# Patient Record
Sex: MALE | Race: WHITE | Employment: STUDENT | URBAN - NONMETROPOLITAN AREA
[De-identification: names, ages, dates, MRNs, and addresses within clinical notes are randomized per-mention and may not be internally consistent; named-entity substitution may affect disease eponyms.]

---
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
## 2024-06-09 ENCOUNTER — HOSPITAL ENCOUNTER (OUTPATIENT)
Age: 24
Discharge: HOME | End: 2024-06-09
Payer: COMMERCIAL

## 2024-06-09 VITALS
RESPIRATION RATE: 16 BRPM | HEART RATE: 104 BPM | HEIGHT: 71 IN | DIASTOLIC BLOOD PRESSURE: 95 MMHG | WEIGHT: 205 LBS | TEMPERATURE: 99 F | BODY MASS INDEX: 28.7 KG/M2 | OXYGEN SATURATION: 98 % | SYSTOLIC BLOOD PRESSURE: 136 MMHG

## 2024-06-09 DIAGNOSIS — L03.119 CELLULITIS OF LOWER EXTREMITY, UNSPECIFIED LATERALITY: ICD-10-CM

## 2024-06-09 DIAGNOSIS — T78.40XA ALLERGIC REACTION, INITIAL ENCOUNTER: Primary | ICD-10-CM

## 2024-06-09 RX ORDER — PREDNISONE 10 MG/1
TABLET ORAL
Qty: 20 TABLET | Refills: 0 | Status: SHIPPED | OUTPATIENT
Start: 2024-06-09 | End: 2024-06-16

## 2024-06-09 RX ORDER — SULFAMETHOXAZOLE AND TRIMETHOPRIM 800; 160 MG/1; MG/1
1 TABLET ORAL 2 TIMES DAILY
Qty: 14 TABLET | Refills: 0 | Status: SHIPPED | OUTPATIENT
Start: 2024-06-09 | End: 2024-06-16

## 2024-06-09 NOTE — ED PROVIDER NOTES
Patient Seen in: [facility]      History     Chief Complaint   Patient presents with    Bite Sting,Insect     Stated Complaint: bug bites    Subjective:   23-year-old male presents today what he believes is bug bites to his lower legs.  Does work outside doing driveways.  Started developing redness and swelling to the area as.  Does have mild itching.  Borderline fever.  No nausea vomiting.  Alert oriented x 3.  No other symptoms or concerns.  The patient's medication list, past medical history and social history elements as listed in today's nurse's notes were reviewed and agreed (except as otherwise stated in the HPI).  The patient's family history reviewed and determined to be noncontributory to the presenting problem            Objective:   History reviewed. No pertinent past medical history.           History reviewed. No pertinent surgical history.             Social History     Socioeconomic History    Marital status: OTHER   Tobacco Use    Smoking status: Never    Smokeless tobacco: Current     Types: Chew   Vaping Use    Vaping status: Some Days   Substance and Sexual Activity    Alcohol use: Yes     Alcohol/week: 2.0 standard drinks of alcohol     Types: 2 Cans of beer per week    Drug use: No     Social Determinants of Health      Received from [facility]    Social Connections    Received from [facility]    Housing Stability              Review of Systems    Positive for stated complaint: bug bites  Other systems are as noted in HPI.  Constitutional and vital signs reviewed.      All other systems reviewed and negative except as noted above.    Physical Exam     ED Triage Vitals [06/09/24 1406]   BP (!) 136/95   Pulse 104   Resp 16   Temp 99.2 °F (37.3 °C)   Temp src Temporal   SpO2 98 %   O2 Device None (Room air)       Current Vitals:   Vital Signs  BP: (!) 136/95  Pulse: 104  Resp: 16  Temp: 99.2 °F (37.3 °C)  Temp src: Temporal    Oxygen  Therapy  SpO2: 98 %  O2 Device: None (Room air)            Physical Exam  Vitals and nursing note reviewed.   Constitutional:       Appearance: Normal appearance.   HENT:      Mouth/Throat:      Mouth: Mucous membranes are moist.   Cardiovascular:      Rate and Rhythm: Normal rate.   Musculoskeletal:      Comments: Multiple open wounds to the lower legs with surrounding swelling redness to the area.   Skin:     General: Skin is warm and dry.   Neurological:      Mental Status: He is alert and oriented to person, place, and time.               ED Course   Labs Reviewed - No data to display                   MDM     Please note that this report has been produced using speech recognition software and may contain errors related to that system including, but not limited to, errors in grammar, punctuation, and spelling, as well as words and phrases that possibly may have been recognized inappropriately.  If there are any questions or concerns, contact the dictating provider for clarification.        Note to patient: The 21st Century Cures Act makes medical notes like these available to patients in the interest of transparency. However, this is a medical document intended as peer to peer communication. It is written in medical language and may contain abbreviations or verbiage that are unfamiliar. It may appear blunt or direct. Medical documents are intended to carry relevant information, facts as evident, and the clinical opinion of the practitioner.                                   Medical Decision Making  Differential diagnosis includes but is not limited to: Cellulitis, allergic reaction      Presents today with possible bug bites to lower legs with open wounds and surrounding redness and swelling.  Possible local reaction versus cellulitis.  Will treat for both.  Patient given prescription for prednisone as well as Duricef.  Skin care instructions given.  To follow-up with his primary care physician in 1 week for  reevaluation.  Go directly to the ER with any worsening of symptoms.  Patient verbalized understanding and agreed to plan of care.    Risk  OTC drugs.  Prescription drug management.        Disposition and Plan     Clinical Impression:  1. Allergic reaction, initial encounter    2. Cellulitis of lower extremity, unspecified laterality         Disposition:  Discharge  6/9/2024  2:16 pm    Follow-up:  Elinor Mast MD  [address] [facility] [address]  [phone]    In 1 week  for recheck          Medications Prescribed:  Current Discharge Medication List        START taking these medications    Details   sulfamethoxazole-trimethoprim -160 MG Oral Tab per tablet Take 1 tablet by mouth 2 (two) times daily for 7 days.  Qty: 14 tablet, Refills: 0      predniSONE 10 MG Oral Tab Take 4 tablets (40 mg total) by mouth daily for 2 days, THEN 3 tablets (30 mg total) daily for 2 days, THEN 2 tablets (20 mg total) daily for 2 days, THEN 1 tablet (10 mg total) daily for 2 days.  Qty: 20 tablet, Refills: 0

## 2024-06-09 NOTE — DISCHARGE INSTRUCTIONS
Wash area with warm soapy water.  Apply antibiotic ointment to open wounds.  Take prednisone in the morning with food to help with inflammation.  Take antibiotic twice a day as prescribed.  Follow-up with primary care physician in 1 week for reevaluation.  If anytime develop any worsening of symptoms go directly to the ER.

## 2024-06-09 NOTE — ED INITIAL ASSESSMENT (HPI)
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 Large red raised areas on bilateral lower legs.

## 2025-01-13 ENCOUNTER — OFFICE VISIT (OUTPATIENT)
Dept: OCCUPATIONAL MEDICINE | Age: 25
End: 2025-01-13

## 2025-01-13 DIAGNOSIS — Z02.89 VISIT FOR OCCUPATIONAL HEALTH EXAMINATION: Primary | ICD-10-CM

## 2025-01-13 PROCEDURE — OH123 RAPID TEST DRUG KIT & COLLECTION 5 PANEL: Performed by: PHYSICIAN ASSISTANT

## 2025-04-22 ENCOUNTER — PATIENT OUTREACH (OUTPATIENT)
Dept: CASE MANAGEMENT | Age: 25
End: 2025-04-22

## 2025-04-22 NOTE — PROCEDURES
The office order for PCP removal request is Approved and finalized on April 22, 2025.    Removed Elinor Mast MD as the patient's Primary Care Physician

## (undated) NOTE — LETTER
Name:  Ruby Peabody Year:  12th Grade Class: Student ID No.:   Address:  [address] [place] [address] Phone:  [phone] (home) [phone] (work) :  17year old   Name Relationship Lgl CtraRachel Mcmahan 3 Work [employer] 15. Does anyone in your family have hypertrophic cardiomyopathy, Marfan syndrome, arrhythmogenic right ventricular cardiomyopathy, long QT syndrome, short QT syndrome, Brugada syndrome, or catecholaminergic polymorphic ventricular tachycardia? No   15.  Arnold 29. Have you ever had a head injury or concussion? No   35. Have you ever had a hit or blow to the head that caused confusion, prolonged headache, or memory problems? No   36. Do you have a history of seizure disorder? No   37.  Do you have headaches with e adult)   Pulse 78   Temp 98.4 °F (36.9 °C) (Temporal)   Resp 20   Ht 69\"   Wt 128 lb 8 oz   BMI 18.98 kg/m²  12 %ile (Z= -1.20) based on CDC (Boys, 2-20 Years) BMI-for-age based on BMI available as of 8/15/2019.  male    Vision: R 20/25          L 20/30 recommendation, consistent with the [facility] [employer], that allows [employer] or Advanced Nurse Practitioners to sign off on physicals.    [facility] Substance Testing Policy Consent to Random Testing   (This section for high school students only) granted to reprint for noncommercial, educational purposes with acknowledgment.    [id number]

## (undated) NOTE — LETTER
[facility] [employer] Child Health Examination       Student's Name  Susie Dupont Birth Date Date     Signature                                                                                                                                              Title                           Date    (If adding dates to the above immu ALLERGIES  (Food, drug, insect, other)  Amoxicillin MEDICATION  (List all prescribed or taken on a regular basis.)    Current Outpatient Prescriptions:   •  Multiple Vitamins-Minerals (MULTIVITAMIN OR), Take 1 tablet by mouth daily. , Disp: , Rfl:    Chase Villarreal DIABETES SCREENING  BMI>85% age/sex  No And any two of the following:  Family History No    Ethnic Minority  No          Signs of Insulin Resistance (hypertension, dyslipidemia, polycystic ovarian syndrome, acanthosis nigricans)    No           At Risk  No Quick-relief  medication (e.g. Short Acting Beta Antagonist): No          Controller medication (e.g. inhaled corticosteroid):   No Other   NEEDS/MODIFICATIONS required in the school setting  None DIETARY Needs/Restrictions     None   SPECIAL INSTR

## (undated) NOTE — LETTER
[facility] CARE IN [place]  [address] [address]  Dept: [phone]  Dept Fax: [phone]         December 12, 2017    Patient: Aaliyah Alvarenga   YOB: 2000   Date of Visit: 12/12/2017       To Whom It May Concern:    A

## (undated) NOTE — LETTER
[facility] Examination       Student's Name  Aurora Kaylin Birth Stephen Date     Signature                                                                                                                                              Title                           Date    (If adding dates to the above immu ALLERGIES  (Food, drug, insect, other) MEDICATION  (List all prescribed or taken on a regular basis.)     Diagnosis of asthma?   Child wakes during the night coughing   Yes   No    Yes   No    Loss of function of one of paired organs? (eye/ear/kidney/testic DIABETES SCREENING  BMI>85% age/sex  No And any two of the following:  Family History No   Ethnic Minority  No          Signs of Insulin Resistance (hypertension, dyslipidemia, polycystic ovarian syndrome, acanthosis nigricans)    No           At Risk  No Quick-relief  medication (e.g. Short Acting Beta Antagonist): No          Controller medication (e.g. inhaled corticosteroid):   No Other   NEEDS/MODIFICATIONS required in the school setting  None DIETARY Needs/Restrictions     None   SPECIAL INSTR

## (undated) NOTE — LETTER
Name:  Kristine Austin Year:  10th Grade Class: Student ID No.:   Address:  [facility] Phone:  [phone] (home) [phone] (work) :  14year old   Name Relationship Lgl Ctra. Marj 3 Work Phone Home Phone Mobile Phone 15. Does anyone in your family have hypertrophic cardiomyopathy, Marfan syndrome, arrhythmogenic right ventricular cardiomyopathy, long QT syndrome, short QT syndrome, Brugada syndrome, or catecholaminergic polymorphic ventricular tachycardia? No   15.  Arnold 29. Have you ever had a head injury or concussion? No   35. Have you ever had a hit or blow to the head that caused confusion, prolonged headache, or memory problems? No   36. Do you have a history of seizure disorder? No   37.  Do you have headaches with e 2-20 Years BMI-for-age data using vitals from 8/10/2017.  male    Vision: R 20/20          L 20/20          BOTH 20/20          Uncorrected   MEDICAL NORMAL ABNORMAL FINDINGS   Appearance:  Marfan stigmata (kyphoscoliosis, high-arched palate, pectus excavat (This section for high school students only)   8524-3381 school term    As a prerequisite to participation in [employer] athletic activities, we agree that I/our student will not use performance-enhancing substances as defined in the [employer] Performance-Enhancing S

## (undated) NOTE — LETTER
Date: 10/24/2019    Patient Name: Moncho Nolen          To Whom it may concern: This letter has been written at the patient's request. The above patient was seen at the [facility] for treatment of a medical condition.     The patient may r

## (undated) NOTE — LETTER
Name:  Fahad Modesto Year:  11th Grade Class: Student ID No.:   Address:  [address] [place] [address] Phone:  [phone] (home) [phone] (work) :  13year old   Name Relationship Lgl Ctra. Marj 3 Work Phone Home Phone Mobile Phone 15. Does anyone in your family have hypertrophic cardiomyopathy, Marfan syndrome, arrhythmogenic right ventricular cardiomyopathy, long QT syndrome, short QT syndrome, Brugada syndrome, or catecholaminergic polymorphic ventricular tachycardia? No   15.  Arnold 29. Have you ever had a head injury or concussion? No   35. Have you ever had a hit or blow to the head that caused confusion, prolonged headache, or memory problems? No   36. Do you have a history of seizure disorder? No   37.  Do you have headaches with e 129 lb 8 oz   SpO2 98%   BMI 18.58 kg/m²  13 %ile (Z= -1.11) based on CDC 2-20 Years BMI-for-age data using vitals from 8/15/2018.  male    Vision: R 20/20          L 20/20          BOTH 20/20          Uncorrected   MEDICAL NORMAL ABNORMAL FINDINGS   Appear physicals.    [facility] Substance Testing Policy Consent to Random Testing   (This section for high school students only)   4360-3121 school term    As a prerequisite to participation in Advanced Catheter Therapiestic activities, we agree that I/our student will not use performa

## (undated) NOTE — LETTER
12/04/19        [address] [place] [address]      Dear Devendra Sanchez,    [facility] records indicate that you have outstanding lab work and or testing that was ordered for you and has not yet been completed:  Orders Placed This Encounter      Ches